# Patient Record
Sex: MALE | Race: WHITE
[De-identification: names, ages, dates, MRNs, and addresses within clinical notes are randomized per-mention and may not be internally consistent; named-entity substitution may affect disease eponyms.]

---

## 2017-03-21 ENCOUNTER — HOSPITAL ENCOUNTER (INPATIENT)
Dept: HOSPITAL 10 - E/R | Age: 67
LOS: 2 days | Discharge: HOME | DRG: 439 | End: 2017-03-23
Attending: INTERNAL MEDICINE | Admitting: INTERNAL MEDICINE
Payer: COMMERCIAL

## 2017-03-21 VITALS
HEIGHT: 64 IN | WEIGHT: 196.21 LBS | BODY MASS INDEX: 33.5 KG/M2 | WEIGHT: 196.21 LBS | BODY MASS INDEX: 33.5 KG/M2 | HEIGHT: 64 IN

## 2017-03-21 VITALS — DIASTOLIC BLOOD PRESSURE: 75 MMHG | RESPIRATION RATE: 20 BRPM | SYSTOLIC BLOOD PRESSURE: 132 MMHG | HEART RATE: 80 BPM

## 2017-03-21 VITALS — TEMPERATURE: 98.6 F

## 2017-03-21 DIAGNOSIS — K70.30: ICD-10-CM

## 2017-03-21 DIAGNOSIS — I10: ICD-10-CM

## 2017-03-21 DIAGNOSIS — E66.01: ICD-10-CM

## 2017-03-21 DIAGNOSIS — F17.210: ICD-10-CM

## 2017-03-21 DIAGNOSIS — F10.20: ICD-10-CM

## 2017-03-21 DIAGNOSIS — K85.90: Primary | ICD-10-CM

## 2017-03-21 DIAGNOSIS — E87.5: ICD-10-CM

## 2017-03-21 DIAGNOSIS — R73.03: ICD-10-CM

## 2017-03-21 DIAGNOSIS — N17.9: ICD-10-CM

## 2017-03-21 LAB
ADD SCAN DIFF: NO
ALBUMIN SERPL-MCNC: 4.2 G/DL (ref 3.3–4.9)
ALBUMIN/GLOB SERPL: 1.23 {RATIO}
ALP SERPL-CCNC: 210 IU/L (ref 42–121)
ALT SERPL-CCNC: 70 IU/L (ref 13–69)
ANION GAP SERPL CALC-SCNC: 17 MMOL/L (ref 8–16)
AST SERPL-CCNC: 103 IU/L (ref 15–46)
BASOPHILS # BLD AUTO: 0.1 10^3/UL (ref 0–0.1)
BASOPHILS NFR BLD: 0.7 % (ref 0–2)
BILIRUB DIRECT SERPL-MCNC: 0 MG/DL (ref 0–0.2)
BILIRUB SERPL-MCNC: 0.2 MG/DL (ref 0.2–1.3)
BUN SERPL-MCNC: 34 MG/DL (ref 7–20)
CALCIUM SERPL-MCNC: 8.8 MG/DL (ref 8.4–10.2)
CHLORIDE SERPL-SCNC: 100 MMOL/L (ref 97–110)
CO2 SERPL-SCNC: 22 MMOL/L (ref 21–31)
CREAT SERPL-MCNC: 1.56 MG/DL (ref 0.61–1.24)
EOSINOPHIL # BLD: 0.1 10^3/UL (ref 0–0.5)
EOSINOPHIL NFR BLD: 0.8 % (ref 0–7)
ERYTHROCYTE [DISTWIDTH] IN BLOOD BY AUTOMATED COUNT: 12.8 % (ref 11.5–14.5)
GLOBULIN SER-MCNC: 3.4 G/DL (ref 1.3–3.2)
GLUCOSE SERPL-MCNC: 146 MG/DL (ref 70–220)
HCT VFR BLD CALC: 41.4 % (ref 42–52)
HGB BLD-MCNC: 14.6 G/DL (ref 14–18)
LYMPHOCYTES # BLD AUTO: 2.5 10^3/UL (ref 0.8–2.9)
LYMPHOCYTES NFR BLD AUTO: 17.5 % (ref 15–51)
MCH RBC QN AUTO: 35.4 PG (ref 29–33)
MCHC RBC AUTO-ENTMCNC: 35.3 G/DL (ref 32–37)
MCV RBC AUTO: 100.2 FL (ref 82–101)
MONOCYTES # BLD: 1.4 10^3/UL (ref 0.3–0.9)
MONOCYTES NFR BLD: 9.5 % (ref 0–11)
NEUTROPHILS # BLD: 10.2 10^3/UL (ref 1.6–7.5)
NEUTROPHILS NFR BLD AUTO: 70.3 % (ref 39–77)
NRBC # BLD MANUAL: 0 10^3/UL (ref 0–0)
NRBC BLD QL: 0 /100WBC (ref 0–0)
PLATELET # BLD: 218 10^3/UL (ref 140–415)
PMV BLD AUTO: 9.2 FL (ref 7.4–10.4)
POTASSIUM SERPL-SCNC: 5.3 MMOL/L (ref 3.5–5.1)
PROT SERPL-MCNC: 7.6 G/DL (ref 6.1–8.1)
RBC # BLD AUTO: 4.13 10^6/UL (ref 4.7–6.1)
SODIUM SERPL-SCNC: 134 MMOL/L (ref 135–144)
WBC # BLD AUTO: 14.5 10^3/UL (ref 4.8–10.8)

## 2017-03-21 PROCEDURE — 96375 TX/PRO/DX INJ NEW DRUG ADDON: CPT

## 2017-03-21 PROCEDURE — C9113 INJ PANTOPRAZOLE SODIUM, VIA: HCPCS

## 2017-03-21 PROCEDURE — 85730 THROMBOPLASTIN TIME PARTIAL: CPT

## 2017-03-21 PROCEDURE — 83690 ASSAY OF LIPASE: CPT

## 2017-03-21 PROCEDURE — 96374 THER/PROPH/DIAG INJ IV PUSH: CPT

## 2017-03-21 PROCEDURE — 36415 COLL VENOUS BLD VENIPUNCTURE: CPT

## 2017-03-21 PROCEDURE — 74176 CT ABD & PELVIS W/O CONTRAST: CPT

## 2017-03-21 PROCEDURE — 90686 IIV4 VACC NO PRSV 0.5 ML IM: CPT

## 2017-03-21 PROCEDURE — 80061 LIPID PANEL: CPT

## 2017-03-21 PROCEDURE — 85610 PROTHROMBIN TIME: CPT

## 2017-03-21 PROCEDURE — 85025 COMPLETE CBC W/AUTO DIFF WBC: CPT

## 2017-03-21 PROCEDURE — 83735 ASSAY OF MAGNESIUM: CPT

## 2017-03-21 PROCEDURE — 82150 ASSAY OF AMYLASE: CPT

## 2017-03-21 PROCEDURE — 84100 ASSAY OF PHOSPHORUS: CPT

## 2017-03-21 PROCEDURE — 80048 BASIC METABOLIC PNL TOTAL CA: CPT

## 2017-03-21 PROCEDURE — 80053 COMPREHEN METABOLIC PANEL: CPT

## 2017-03-21 RX ADMIN — AMLODIPINE BESYLATE SCH MG: 10 TABLET ORAL at 23:00

## 2017-03-21 RX ADMIN — FOLIC ACID SCH MLS/HR: 5 INJECTION, SOLUTION INTRAMUSCULAR; INTRAVENOUS; SUBCUTANEOUS at 21:37

## 2017-03-21 RX ADMIN — FOLIC ACID SCH MLS/HR: 5 INJECTION, SOLUTION INTRAMUSCULAR; INTRAVENOUS; SUBCUTANEOUS at 23:54

## 2017-03-21 NOTE — ERA
ER Documentation


Chief Complaint


Date/Time


DATE: 3/21/17 


TIME: 19:17


Chief Complaint


LUQ PAIN RADIATES TO THE BACK





HPI


The patient is a 76-year-old male, presenting to the ER because of left upper 

quadrant abdominal pain intermittently for the last 3 days, 5/10, worse with 

constipation.  He saw his physician 3 days ago and treated with pain 

medication.  He denies fever, chills, neck pain, chest pain, dyspnea, vomiting, 

dysuria.  He smokes and drinks





Past medical history: Hypertension





ROS


All systems reviewed and are negative except as per history of present illness.





Medications


Home Meds


Reported Medications


Amlodipine Besylate* (Amlodipine Besylate*) 10 Mg Tablet, 10 MG PO DAILY, #30 

TAB


   3/21/17


Atenolol* (Atenolol*) 25 Mg Tablet, 25 MG PO DAILY


   7/15/13


Discontinued Reported Medications


Diclofenac Sodium* (Diclofenac Sodium*) 75 Mg Tablet.dr, 75 MG PO DAILY, TAB


   10/23/15


Aspirin Ec (Aspir 81) 81 Mg Tablet.dr, 81 MG PO DAILY


   7/15/13


Discontinued Scripts


Naproxen* (Naprosyn*) 500 Mg Tablet, 500 MG PO BID, #30 TAB


   Prov:TATIANA ADAMS DO         10/23/15


Diazepam* (Valium*) 5 Mg Tablet, 5 MG PO Q8 for MUSCLE SPASMS, #20 TAB


   Prov:TATIANA ADAMS DO         10/23/15


Hydrocodone Bit-Acetaminophen* (Norco*) 5-325 Mg Tab, 1 TAB PO Q6 Y for PAIN, #

20 TAB


   Prov:TATIANA ADAMS DO         10/23/15





Allergies


Allergies:  


Coded Allergies:  


     No Known Drug Allergy (Verified  Allergy, Mild, 3/21/17)





PMhx/Soc


History of Surgery:  No


Anesthesia Reaction:  No


Hx Neurological Disorder:  No


Hx Respiratory Disorders:  No


Hx Cardiac Disorders:  Yes (htn)


Hx Psychiatric Problems:  No


Hx Miscellaneous Medical Probl:  Yes (ALCOHOL USE, arthritis)


Hx Alcohol Use:  Yes


Hx Substance Use:  No


Hx Tobacco Use:  No





Physical Exam


Vitals





Vital Signs








  Date Time  Temp Pulse Resp B/P Pulse Ox O2 Delivery O2 Flow Rate FiO2


 


3/21/17 19:30 98.0 72 18 151/71 96 Room Air  


 


3/21/17 15:09 98.0 80 19 137/69 98   








Physical Exam


 Const:      No acute distress.


 Head:        Atraumatic.


 Eyes:       Normal Conjunctiva.


 ENT:         Normal External Ears, Nose and Mouth.


 Neck:        Full range of motion.  No meningismus.


 Resp:         Clear to auscultation bilaterally.


 Cardio:       Regular rate and rhythm, no murmurs.


 Abd:         Soft,  non distended, normal bowel sounds, moderate left upper 

quadrant tenderness, no right lower quadrant, right upper quadrant, rigidity, 

rebound, CVA tenderness


 Skin:         No petechiae or rashes.


 Back:        No midline or flank tenderness.


 Ext:          No cyanosis, or edema.


 Neur:        Awake and alert. No focal deficit


 Psych:        Normal Mood and Affect.


Result Diagram:  


3/21/17 1945                                                                   

             3/21/17 1945





Results 24 hrs





 Laboratory Tests








Test


  3/21/17


19:45


 


Alanine Aminotransferase


(ALT/SGPT) 70IU/L 


 


 


Albumin 4.2g/dl 


 


Albumin/Globulin Ratio 1.23 


 


Alkaline Phosphatase 210IU/L 


 


Anion Gap 17 


 


Aspartate Amino Transf


(AST/SGOT) 103IU/L 


 


 


Basophils # 0.110^3/ul 


 


Basophils % 0.7% 


 


Blood Urea Nitrogen 34mg/dl 


 


Calcium Level 8.8mg/dl 


 


Carbon Dioxide Level 22mmol/L 


 


Chloride Level 100mmol/L 


 


Creatinine 1.56mg/dl 


 


Direct Bilirubin 0.00mg/dl 


 


Eosinophils # 0.110^3/ul 


 


Eosinophils % 0.8% 


 


Globulin 3.40g/dl 


 


Glucose Level 146mg/dl 


 


Hematocrit 41.4% 


 


Hemoglobin 14.6g/dl 


 


Indirect Bilirubin 0.2mg/dl 


 


Lipase 366U/L 


 


Lymphocytes # 2.510^3/ul 


 


Lymphocytes % 17.5% 


 


Mean Corpuscular Hemoglobin 35.4pg 


 


Mean Corpuscular Hemoglobin


Concent 35.3g/dl 


 


 


Mean Corpuscular Volume 100.2fl 


 


Mean Platelet Volume 9.2fl 


 


Monocytes # 1.410^3/ul 


 


Monocytes % 9.5% 


 


Neutrophils # 10.210^3/ul 


 


Neutrophils % 70.3% 


 


Nucleated Red Blood Cells # 0.010^3/ul 


 


Nucleated Red Blood Cells % 0.0/100WBC 


 


Platelet Count 67291^3/UL 


 


Potassium Level 5.3mmol/L 


 


Red Blood Count 4.1310^6/ul 


 


Red Cell Distribution Width 12.8% 


 


Sodium Level 134mmol/L 


 


Total Bilirubin 0.2mg/dl 


 


Total Protein 7.6g/dl 


 


White Blood Count 14.510^3/ul 








 Current Medications








 Medications


  (Trade)  Dose


 Ordered  Sig/Chelly


 Route


 PRN Reason  Start Time


 Stop Time Status Last Admin


Dose Admin


 


 Morphine Sulfate


  (morphine)  4 mg  ONCE  STAT


 IV


   3/21/17 19:22


 3/21/17 19:24 DC 3/21/17 19:55


 


 


 Ondansetron HCl 4


 mg  4 mg  ONCE  STAT


 IV


   3/21/17 19:22


 3/21/17 19:24 DC 3/21/17 19:56


 


 


 Sodium Chloride


  (NS)  500 ml @ 


 500 mls/hr  Q1H ONCE


 IV


   3/21/17 21:00


 3/21/17 21:59 UNV  


 











Procedures/[place] [address]


 Radiology Main Line: [phone]





 DIAGNOSTIC IMAGING REPORT





 Patient: ANA NIÑO   : 1950   Age: 66  Sex: M                  

      


 MR #:    [id number]   Acct #:   [id number]    DOS: 17


 Ordering MD: BRENTON QUINN MD   Location:  E/R   Room/Bed:                  

                          


 








PROCEDURE:   CT abdomen and pelvis without contrast. 


 


CLINICAL INDICATION:   Abdominal Pain   


 


TECHNIQUE:   CT scan of the abdomen and pelvis without contrast was performed 

and is reconstructed at 2.5 mm contiguous axial intervals from the dome of the 

diaphragm to the inferior pubic rami..  The patient was scanned without 

intravenous contrast.  Sagittal and coronal reformatted images were obtained 

from the axial source images. The calculated radiation dose measures 1191 mGy 

centimeters. The CTDI measures 21 mGy.


 


 


COMPARISON:   None. 


 


FINDINGS:


 


 


The lung bases are clear of any infiltrate or nodule.  No effusion is seen.


The liver has a nodular contour compatible with cirrhosis.  There is diminished 

attenuation.  No mass or ductal dilatation is seen.  Patency of the portal vein 

is indeterminate on this noncontrast study.    No gallstones are visualized.  

No splenic, adrenal or pancreatic abnormalities present. 


 Kidneys are of normal size and contour.  No hydronephrosis, calculus or masses 

seen.  Ureters are of normal course and caliber with no stone.  No bladder mass 

or stone is present. Prostate and seminal vesicles appear normal.  


  There is no aneurysm.  No adenopathy is present. There are vascular 

calcifications.


  No bowel mass or obstruction is present.  The appendix is normal.  No phlegmon

, ascites or pneumoperitoneum is visualized.


The osseous structures are intact.


 


IMPRESSION:


No evidence of urolithiasis, obstructive uropathy, diverticulitis or 

appendicitis.


 


Cirrhosis.


 


Vascular calcifications.


_____________________________________________ 


.Ned Roman MD, MD           Date    Time 


Electronically viewed and signed by .Ned Roman MD, MD on 2017 20:

27 


 


D:  2017 20:27  T:  2017 20:27


.A/





CC: BRENTON QUINN MD





MEDICAL MAKING DECISION: The patient is a 66-year-old male, presenting with 

acute pancreatitis, acute hyperkalemia, acute kidney injury.  He was treated 

with normal saline 500 mL normal saline IV morphine 4 mg IV for pain, Zofran 4 

mg IV for nausea with good response.  The differential diagnoses considered 

include but are not limited to cholelithiasis, cholecystitis, cystitis, 

pancreatitis, hepatitis, gastritis, peptic ulcer disease, gastric ulcer, 

appendicitis, diverticulitis, cholangitis, choledocholithiasis, partial small 

bowel obstruction.





Departure


Diagnosis:  


 Primary Impression:  


 Pancreatitis


 Additional Impressions:  


 Hyperkalemia


 Acute kidney injury


Condition:  Stable


Comments


I discussed the findings with the patient. I discussed the patient with his 

physician Dr. Parker who was made aware of the lab, the treatment, the patient 

condition. The patient is admitted to medical surgery bed at 8:50 pm











BRENTON QUINN MD Mar 21, 2017 19:18

## 2017-03-22 VITALS — SYSTOLIC BLOOD PRESSURE: 143 MMHG | DIASTOLIC BLOOD PRESSURE: 72 MMHG | RESPIRATION RATE: 18 BRPM

## 2017-03-22 VITALS — DIASTOLIC BLOOD PRESSURE: 64 MMHG | RESPIRATION RATE: 20 BRPM | SYSTOLIC BLOOD PRESSURE: 135 MMHG

## 2017-03-22 LAB
ADD SCAN DIFF: NO
ALBUMIN SERPL-MCNC: 3.5 G/DL (ref 3.3–4.9)
ALBUMIN/GLOB SERPL: 1.2 {RATIO}
ALP SERPL-CCNC: 166 IU/L (ref 42–121)
ALT SERPL-CCNC: 61 IU/L (ref 13–69)
AMYLASE SERPL-CCNC: 58 U/L (ref 11–123)
ANION GAP SERPL CALC-SCNC: 15 MMOL/L (ref 8–16)
APTT BLD: 30.1 SEC (ref 25–35)
AST SERPL-CCNC: 66 IU/L (ref 15–46)
BASOPHILS # BLD AUTO: 0.1 10^3/UL (ref 0–0.1)
BASOPHILS NFR BLD: 1.1 % (ref 0–2)
BILIRUB DIRECT SERPL-MCNC: 0 MG/DL (ref 0–0.2)
BILIRUB SERPL-MCNC: 0.4 MG/DL (ref 0.2–1.3)
BUN SERPL-MCNC: 29 MG/DL (ref 7–20)
CALCIUM SERPL-MCNC: 8.3 MG/DL (ref 8.4–10.2)
CHLORIDE SERPL-SCNC: 107 MMOL/L (ref 97–110)
CHOLEST SERPL-MCNC: 168 MG/DL (ref 100–200)
CHOLEST/HDLC SERPL: 2.8 RATIO
CO2 SERPL-SCNC: 19 MMOL/L (ref 21–31)
CREAT SERPL-MCNC: 1.16 MG/DL (ref 0.61–1.24)
EOSINOPHIL # BLD: 0.2 10^3/UL (ref 0–0.5)
EOSINOPHIL NFR BLD: 1.7 % (ref 0–7)
ERYTHROCYTE [DISTWIDTH] IN BLOOD BY AUTOMATED COUNT: 13.1 % (ref 11.5–14.5)
GLOBULIN SER-MCNC: 2.9 G/DL (ref 1.3–3.2)
GLUCOSE SERPL-MCNC: 113 MG/DL (ref 70–220)
HCT VFR BLD CALC: 41.8 % (ref 42–52)
HDLC SERPL-MCNC: 58 MG/DL (ref 30–78)
HGB BLD-MCNC: 14 G/DL (ref 14–18)
INR PPP: 1.09
LYMPHOCYTES # BLD AUTO: 2.7 10^3/UL (ref 0.8–2.9)
LYMPHOCYTES NFR BLD AUTO: 23.4 % (ref 15–51)
MAGNESIUM SERPL-MCNC: 2.3 MG/DL (ref 1.7–2.5)
MCH RBC QN AUTO: 34.8 PG (ref 29–33)
MCHC RBC AUTO-ENTMCNC: 33.5 G/DL (ref 32–37)
MCV RBC AUTO: 104 FL (ref 82–101)
MONOCYTES # BLD: 1.2 10^3/UL (ref 0.3–0.9)
MONOCYTES NFR BLD: 10.7 % (ref 0–11)
NEUTROPHILS # BLD: 7.1 10^3/UL (ref 1.6–7.5)
NEUTROPHILS NFR BLD AUTO: 62.1 % (ref 39–77)
NRBC # BLD MANUAL: 0 10^3/UL (ref 0–0)
NRBC BLD QL: 0 /100WBC (ref 0–0)
PHOSPHATE SERPL-MCNC: 3.2 MG/DL (ref 2.5–4.9)
PLATELET # BLD: 204 10^3/UL (ref 140–415)
PMV BLD AUTO: 10.5 FL (ref 7.4–10.4)
POTASSIUM SERPL-SCNC: 5.2 MMOL/L (ref 3.5–5.1)
PROT SERPL-MCNC: 6.4 G/DL (ref 6.1–8.1)
PROTHROMBIN TIME: 14.1 SEC (ref 12.2–14.2)
PT RATIO: 1.1
RBC # BLD AUTO: 4.02 10^6/UL (ref 4.7–6.1)
SODIUM SERPL-SCNC: 136 MMOL/L (ref 135–144)
TRIGL SERPL-MCNC: 99 MG/DL (ref 0–149)
WBC # BLD AUTO: 11.5 10^3/UL (ref 4.8–10.8)

## 2017-03-22 RX ADMIN — FOLIC ACID SCH MLS/HR: 5 INJECTION, SOLUTION INTRAMUSCULAR; INTRAVENOUS; SUBCUTANEOUS at 07:05

## 2017-03-22 RX ADMIN — FOLIC ACID SCH MLS/HR: 5 INJECTION, SOLUTION INTRAMUSCULAR; INTRAVENOUS; SUBCUTANEOUS at 16:24

## 2017-03-22 RX ADMIN — PANTOPRAZOLE SODIUM SCH MG: 40 TABLET, DELAYED RELEASE ORAL at 17:09

## 2017-03-22 RX ADMIN — FOLIC ACID SCH MLS/HR: 5 INJECTION, SOLUTION INTRAMUSCULAR; INTRAVENOUS; SUBCUTANEOUS at 09:00

## 2017-03-22 RX ADMIN — AMLODIPINE BESYLATE SCH MG: 10 TABLET ORAL at 20:59

## 2017-03-22 NOTE — HP
DATE OF ADMISSION: 03/21/2017

 

ADMITTING PHYSICIAN:  Dr. Parker 

 

PRIMARY CARE PHYSICIAN:  Dr. Shaan Meraz 

 

CHIEF COMPLAINT ON ADMISSION:  Abdominal pain.

 

HISTORY OF PRESENT ILLNESS:  This is a 66-year-old male with a history of cirrhosis, alcohol abuse, 
hypertension who presented to the emergency department with a complaint of periumbilical epigastric 
pain also going to the lower quadrant of his abdomen.  This has been going on for the past week, and
 he reports that it has been intermittently worsening, up to 5/10.  The patient did have episodes of
 constipation, but did have a bowel movement yesterday.  Apparently he went to his PCP, was given pa
in medication at that time.  He denies any fevers, chills, nausea, vomiting, diarrhea, melena, hemat
emesis, or hematochezia.  According to the wife who was at bedside, the patient has been having a co
ugh for the past week.  It looks like they both had upper respiratory infection, and last week he di
d have an episode of nosebleed.  Yesterday he coughed up a little bit of blood, but has resolved sin
ce then.  The patient is also a tobacco user.  He smokes 1 pack a day.  In the emergency department,
 he was found to have elevated lipase diagnosed with mild pancreatitis.  CAT scan of the abdomen and
 pelvis did not show any inflammation, however.  He has been n.p.o. overnight.  His lipase is coming
 down.  He is hungry this morning.  He denies any nausea or vomiting and he reports that his pain is
 better.

 

ALLERGIES:  NO KNOWN ALLERGIES.

 

PAST MEDICAL HISTORY:

1.  Hypertension.

2.  Cirrhosis.

3.  Alcohol use.

4.  Tobacco use.

5.  Prediabetes according to the family.

 

PAST SURGICAL HISTORY:  None.

 

SOCIAL HISTORY:  The patient drinks up to 6 beers a day for the past 20 years.  This has not stopped
.  His last beer was yesterday, he smokes 1 pack a day for the past 7 years.  No other history of dr
ug use.  He lives with his wife.

 

OUTPATIENT MEDICATIONS:

1.  Norvasc 10 mg p.o. daily.

2.  Atenolol 25 mg p.o. daily.

 

REVIEW OF SYSTEMS:  As per HPI.  The patient denies any previous history of coronary artery disease 
or cerebrovascular accident.  He is ambulating without any DME, very stable at baseline.

 

PHYSICAL EXAMINATION:

VITAL SIGNS:  Temperature 97.9, heart rate of 73, sinus rhythm, respiratory rate of 18, blood pressu
re 143/72.  Patient is satting 96% on room air.

GENERAL:  He is alert and oriented x4.  He is in no acute distress currently.  He is obese with an o
bese abdomen.

HEENT:  Pupils are equally round and reactive to light.  Extraocular muscles are intact.  Anicteric 
sclerae.

NECK:  No JVD, no thyromegaly.

LUNGS:  Clear to auscultation bilaterally.  No wheezes or crackles heard.

ABDOMEN:  Obese.  He reports currently that most of his discomfort is on the lower abdomen more so a
nd a little bit in the epigastric area.

EXTREMITIES:  No edema, clubbing, or cyanosis.

HEART:  Regular rate and rhythm.  No murmur, rubs, or gallops.

NEUROLOGIC:  Grossly intact.

 

LABORATORY DATA:  White blood cell count is 11.5 this morning, hemoglobin of 14.0, MCV is 104, plate
let count of 204, hematocrit 41.8 Chemistry with a sodium of 136, potassium 5.2, chloride 107, bicar
bonate 19, BUN 29, creatinine 1.16, glucose of 113, phosphorus 3.2, magnesium 2.3, total bilirubin 0
.4, AST 66, ALT 61, alkaline phosphatase of 166.  Total protein 6.4, albumin 3.5.  A fasting lipid p
katie is fairly stable.  Amylase is 58, lipase is down to 238.  INR 1.09, PT 14.1, PTT 30.1.

 

RADIOLOGICAL DATA:  CAT scan of the abdomen and pelvis shows no evidence of urolithiasis, obstructiv
e uropathy, diverticulitis, or appendicitis.  Cirrhosis is noted with liver with nodular contour, di
minished attenuation, no mass or ductal dilatation.  Patency of the portal vein is indeterminate.  H
e no gallstones.  No splenic, adrenal, or pancreatic abnormalities, and there is vascular calcificat
ion noted.

 

ASSESSMENT AND PLAN:  This is a 66-year-old male with:

1.  Abdominal pain, looks like more epigastric and it extends to the lower quadrants for what the pia weston is reporting.  CAT scan is fairly unremarkable besides cirrhosis.  He is an active alcohol dri
nker at this time; therefore, he is pancreatitis, is likely related to his alcohol consumption.  I h
ave discussed this with him.  He has a banana bag on board.  Will start him on clear liquids since h
is pancreatic enzymes are improved at this point and monitor for another 24 hours.

2.  Cirrhosis.  Again I have advised for the patient to quit drinking at this point, he is agreeable
 to do.

3.  Tobacco use.  Will provide with a nicotine patch and again he is advised to quit drinking.

4.  Morbid obesity.  Weight loss is encouraged.

5.  Hypertension.  Continue home medication.

6.  Prediabetes.  Will check a hemoglobin A1c.  For now, his blood sugars are fairly stable.

7.  Prophylaxis:  Sequential compression devices to lower extremity for deep venous thrombosis proph
ylaxis and patient on Protonix, will increase to b.i.d. for possible ulcer, gastric.

 

DISPOSITION:  The patient may be discharged in the next 24 hours if remains stable and tolerates p.o
.

 

 

Dictated By: ASHLI CEDILLO/CORRINA

DD:    03/22/2017 11:01:15

DT:    03/22/2017 12:14:13

Conf#: [id number]

DID#:  [id number]

## 2017-03-23 VITALS — SYSTOLIC BLOOD PRESSURE: 155 MMHG | DIASTOLIC BLOOD PRESSURE: 70 MMHG | RESPIRATION RATE: 17 BRPM

## 2017-03-23 LAB
ADD SCAN DIFF: NO
AMYLASE SERPL-CCNC: 81 U/L (ref 11–123)
ANION GAP SERPL CALC-SCNC: 16 MMOL/L (ref 8–16)
BASOPHILS # BLD AUTO: 0.1 10^3/UL (ref 0–0.1)
BASOPHILS NFR BLD: 1.7 % (ref 0–2)
BUN SERPL-MCNC: 18 MG/DL (ref 7–20)
CALCIUM SERPL-MCNC: 8.3 MG/DL (ref 8.4–10.2)
CHLORIDE SERPL-SCNC: 108 MMOL/L (ref 97–110)
CO2 SERPL-SCNC: 18 MMOL/L (ref 21–31)
CREAT SERPL-MCNC: 0.95 MG/DL (ref 0.61–1.24)
EOSINOPHIL # BLD: 0.4 10^3/UL (ref 0–0.5)
EOSINOPHIL NFR BLD: 4.8 % (ref 0–7)
ERYTHROCYTE [DISTWIDTH] IN BLOOD BY AUTOMATED COUNT: 12.8 % (ref 11.5–14.5)
GLUCOSE SERPL-MCNC: 93 MG/DL (ref 70–220)
HCT VFR BLD CALC: 41.7 % (ref 42–52)
HGB BLD-MCNC: 14.1 G/DL (ref 14–18)
LYMPHOCYTES # BLD AUTO: 2.6 10^3/UL (ref 0.8–2.9)
LYMPHOCYTES NFR BLD AUTO: 32.4 % (ref 15–51)
MAGNESIUM SERPL-MCNC: 1.8 MG/DL (ref 1.7–2.5)
MCH RBC QN AUTO: 33.6 PG (ref 29–33)
MCHC RBC AUTO-ENTMCNC: 33.8 G/DL (ref 32–37)
MCV RBC AUTO: 99.3 FL (ref 82–101)
MONOCYTES # BLD: 0.9 10^3/UL (ref 0.3–0.9)
MONOCYTES NFR BLD: 11.2 % (ref 0–11)
NEUTROPHILS # BLD: 3.9 10^3/UL (ref 1.6–7.5)
NEUTROPHILS NFR BLD AUTO: 48.5 % (ref 39–77)
NRBC # BLD MANUAL: 0 10^3/UL (ref 0–0)
NRBC BLD QL: 0 /100WBC (ref 0–0)
PHOSPHATE SERPL-MCNC: 3.7 MG/DL (ref 2.5–4.9)
PLATELET # BLD: 200 10^3/UL (ref 140–415)
PMV BLD AUTO: 9.3 FL (ref 7.4–10.4)
POTASSIUM SERPL-SCNC: 4.5 MMOL/L (ref 3.5–5.1)
RBC # BLD AUTO: 4.2 10^6/UL (ref 4.7–6.1)
SODIUM SERPL-SCNC: 137 MMOL/L (ref 135–144)
WBC # BLD AUTO: 8.1 10^3/UL (ref 4.8–10.8)

## 2017-03-23 RX ADMIN — FOLIC ACID SCH MLS/HR: 5 INJECTION, SOLUTION INTRAMUSCULAR; INTRAVENOUS; SUBCUTANEOUS at 13:05

## 2017-03-23 RX ADMIN — PANTOPRAZOLE SODIUM SCH MG: 40 TABLET, DELAYED RELEASE ORAL at 05:35

## 2017-03-23 RX ADMIN — FOLIC ACID SCH MLS/HR: 5 INJECTION, SOLUTION INTRAMUSCULAR; INTRAVENOUS; SUBCUTANEOUS at 10:18

## 2017-03-23 RX ADMIN — FOLIC ACID SCH MLS/HR: 5 INJECTION, SOLUTION INTRAMUSCULAR; INTRAVENOUS; SUBCUTANEOUS at 04:32

## 2017-03-23 NOTE — PDOCDIS
Discharge Instructions


CONDITION


Patient Condition:  Good





HOME CARE INSTRUCTIONS:


Special Diet:  Soft diet





ACTIVITY:








Activity Restrictions:   No Restrictions











FOLLOW UP/APPOINTMENTS


Appointments


Follow up with PCP within 1 weeks 


Follow up with GI outpatient re Cirrhosis within 1 to 2 weeks











ASHLI BARDALES Mar 23, 2017 14:20

## 2017-03-23 NOTE — PN
Date/Time of Note


Date/Time of Note


DATE: 3/23/17 


TIME: 13:44





Assessment/Plan


VTE Prophylaxis


VTE Prophylaxis Intervention:  SCD's





Lines/Catheters


IV Catheter Type (from Nrs):  Peripheral IV


Urinary Cath still in place:  No





Assessment/Plan


Assessment/Plan


66-year-old male with:





1.  Abdominal pain, looks like more epigastric and it extends to the lower 

quadrants for what the patient is reporting.


CAT scan is fairly unremarkable besides cirrhosis.  


ETOH use, but patient agrees to quit at this time 


Continue Thiamine, Folate, MVI


Tolerating soft diet  


D/c Home 





2.  Cirrhosis. quit ETOH today.


 Follow up with GI outpatient





3.  Tobacco use. Patient agrees to quit drinking.





4.  Morbid obesity.  Weight loss is encouraged.





5.  Hypertension.  Continue home medication.





6.  Prediabetes.  Diabetic diet. weight loss .





Prophylaxis:  Sequential compression devices to lower extremity for deep venous 

thrombosis prophylaxis and patient on Protonix, will increase to b.i.d. for 

possible ulcer, gastric.


DISPOSITION:  D/c home today and follow up with GI as outpatient





Subjective


24 Hr Interval Summary


Free Text/Dictation


Patient doing well today, tolerating po and no complaints, wants to go home 


WBC wnl 


D/c home





Exam/Review of Systems


Vital Signs


Vitals





 Vital Signs








  Date Time  Temp Pulse Resp B/P Pulse Ox O2 Delivery O2 Flow Rate FiO2


 


3/23/17 08:44 98.4 62 17 155/70 97   


 


3/21/17 22:50      Room Air  














 Intake and Output   


 


 3/22/17 3/22/17 3/23/17





 14:59 22:59 06:59


 


Intake Total  1680 ml 1825 ml


 


Output Total  1650 ml 1720 ml


 


Balance  30 ml 105 ml











Exam


Constitutional:  alert, obese, oriented, well developed


Respiratory:  clear to auscultation, normal air movement


Cardiovascular:  nl pulses, regular rate and rhythm


Gastrointestinal:  non-tender, soft


Musculoskeletal:  nl extremities to inspection


Extremities:  normal pulses, other (no edema, clubbing or cyanosis )


Neurological:  CNS II-XII intact, nl mental status, nl speech, nl strength





Results


Result Diagram:  


3/23/17 0451                                                                   

             3/23/17 0451





Results 24 hrs





Laboratory Tests








Test


  3/23/17


04:51


 


White Blood Count 8.1  #


 


Red Blood Count 4.20  L


 


Hemoglobin 14.1  


 


Hematocrit 41.7  L


 


Mean Corpuscular Volume 99.3  


 


Mean Corpuscular Hemoglobin 33.6  H


 


Mean Corpuscular Hemoglobin


Concent 33.8  


 


 


Red Cell Distribution Width 12.8  


 


Platelet Count 200  


 


Mean Platelet Volume 9.3  


 


Neutrophils % 48.5  


 


Lymphocytes % 32.4  


 


Monocytes % 11.2  H


 


Eosinophils % 4.8  


 


Basophils % 1.7  


 


Nucleated Red Blood Cells % 0.0  


 


Neutrophils # 3.9  


 


Lymphocytes # 2.6  


 


Monocytes # 0.9  


 


Eosinophils # 0.4  


 


Basophils # 0.1  


 


Nucleated Red Blood Cells # 0.0  


 


Sodium Level 137  


 


Potassium Level 4.5  


 


Chloride Level 108  


 


Carbon Dioxide Level 18  L


 


Anion Gap 16  


 


Blood Urea Nitrogen 18  #


 


Creatinine 0.95  


 


Glucose Level 93  


 


Calcium Level 8.3  L


 


Phosphorus Level 3.7  


 


Magnesium Level 1.8  


 


Amylase Level 81  


 


Lipase 248  











Medications


Medications





 Current Medications


Sodium Chloride (NS) 1,000 ml @  100 mls/hr Q10H IV  Last administered on 3/23/

17at 04:32; Admin Dose 100 MLS/HR;  Start 3/21/17 at 21:05


Ondansetron HCl (Zofran Inj) 4 mg Q6H  PRN IV NAUSEA AND/OR VOMITING;  Start 3/

21/17 at 21:30


Acetaminophen (Tylenol Tab) 650 mg Q6H  PRN PO PAIN LEVEL 1-3 OR FEVER;  Start 3

/21/17 at 21:30


Acetaminophen/ Hydrocodone Bitart (Norco (5/325)) 1 tab Q6H  PRN PO MODERATE 

PAIN LEVEL 4-6 Last administered on 3/23/17at 04:40; Admin Dose 1 TAB;  Start 3/

21/17 at 21:30


Acetaminophen/ Hydrocodone Bitart (Norco (5/325)) 2 tab Q6H  PRN PO SEVERE PAIN 

LEVEL 7-10 Last administered on 3/22/17at 20:03; Admin Dose 2 TAB;  Start 3/21/

17 at 21:30


Morphine Sulfate (morphine) 2 mg Q4H  PRN IV SEVERE PAIN LEVEL 7-10 Last 

administered on 3/22/17at 07:54; Admin Dose 2 MG;  Start 3/21/17 at 21:30


Docusate Sodium (Colace) 100 mg Q12H  PRN PO CONSTIPATION;  Start 3/21/17 at 21:

30


Magnesium Hydroxide (Milk Of Mag) 30 ml DAILY  PRN PO CONSTIPATION;  Start 3/21/

17 at 21:30


Bisacodyl (Dulcolax Supp) 10 mg DAILY  PRN [place] CONSTIPATION;  Start 3/21/17 at 21

:30


Atenolol 25 mg 25 mg DAILY PO  Last administered on 3/23/17at 10:18; Admin Dose 

25 MG;  Start 3/22/17 at 09:00


Multivitamins/ Thiamine HCl/ Folic Acid/Sodium Chloride (Mvi-12 Adult/ Vitamin 

B1/Folic Acid/NS) 1,011.2 ml  @ 100 mls/ hr DAILY IVPB  Last administered on 3/

23/17at 10:18; Admin Dose 100 MLS/HR;  Start 3/21/17 at 21:30


Amlodipine Besylate (Norvasc) 10 mg QHS PO  Last administered on 3/22/17at 20:59

; Admin Dose 10 MG;  Start 3/21/17 at 21:30


Pantoprazole (Protonix Tab) 40 mg BID@06,18 PO  Last administered on 3/23/17at 

05:35; Admin Dose 40 MG;  Start 3/22/17 at 18:00











ASHLI BARDALES Mar 23, 2017 13:53

## 2018-03-12 ENCOUNTER — HOSPITAL ENCOUNTER (EMERGENCY)
Dept: HOSPITAL 91 - FTE | Age: 68
Discharge: LEFT BEFORE DISCHARGE | End: 2018-03-12
Payer: SELF-PAY

## 2018-03-12 ENCOUNTER — HOSPITAL ENCOUNTER (EMERGENCY)
Age: 68
Discharge: LEFT BEFORE DISCHARGE | End: 2018-03-12

## 2018-03-12 DIAGNOSIS — Z53.21: Primary | ICD-10-CM

## 2021-05-15 ENCOUNTER — HOSPITAL ENCOUNTER (INPATIENT)
Dept: HOSPITAL 54 - TELE | Age: 71
LOS: 3 days | Discharge: HOME | DRG: 377 | End: 2021-05-18
Attending: NURSE PRACTITIONER | Admitting: NURSE PRACTITIONER
Payer: MEDICARE

## 2021-05-15 VITALS — SYSTOLIC BLOOD PRESSURE: 143 MMHG | DIASTOLIC BLOOD PRESSURE: 81 MMHG

## 2021-05-15 VITALS — BODY MASS INDEX: 35.68 KG/M2 | WEIGHT: 209 LBS | HEIGHT: 64 IN

## 2021-05-15 VITALS — DIASTOLIC BLOOD PRESSURE: 81 MMHG | SYSTOLIC BLOOD PRESSURE: 143 MMHG

## 2021-05-15 DIAGNOSIS — N17.0: ICD-10-CM

## 2021-05-15 DIAGNOSIS — N40.1: ICD-10-CM

## 2021-05-15 DIAGNOSIS — E11.22: ICD-10-CM

## 2021-05-15 DIAGNOSIS — Z79.1: ICD-10-CM

## 2021-05-15 DIAGNOSIS — Z79.84: ICD-10-CM

## 2021-05-15 DIAGNOSIS — E66.01: ICD-10-CM

## 2021-05-15 DIAGNOSIS — K74.60: ICD-10-CM

## 2021-05-15 DIAGNOSIS — K26.4: ICD-10-CM

## 2021-05-15 DIAGNOSIS — N18.9: ICD-10-CM

## 2021-05-15 DIAGNOSIS — D62: ICD-10-CM

## 2021-05-15 DIAGNOSIS — K29.80: ICD-10-CM

## 2021-05-15 DIAGNOSIS — I12.9: ICD-10-CM

## 2021-05-15 DIAGNOSIS — N40.0: ICD-10-CM

## 2021-05-15 DIAGNOSIS — K29.70: ICD-10-CM

## 2021-05-15 DIAGNOSIS — F17.210: ICD-10-CM

## 2021-05-15 DIAGNOSIS — K25.4: Primary | ICD-10-CM

## 2021-05-15 PROCEDURE — G0378 HOSPITAL OBSERVATION PER HR: HCPCS

## 2021-05-15 PROCEDURE — C9113 INJ PANTOPRAZOLE SODIUM, VIA: HCPCS

## 2021-05-15 NOTE — NUR
TELE RN ADMISSION NOTES 

RECEIVED DIRECT ADMIT FROM [facility] THIS 69 YO MALE,PER CAMI,ALERT,ORIENTED X4, WITH CHIEF 
COMPLAINTS OF ABDOMINAL PAIN AND BLACK STOOL PRIOR TO ADMISSION. ABLE TO AMBULATE WITH 
STEADY GAIT,ABLE TO PROVIDE HISTORY,PER FAMILY,HE GOT ALLERGY TO MORPHINE.NO SKIN 
ISSUES,SALINE LOCK LEFT AC INTACT AND PATENT.PROTONIX IV GIVEN IN [place],ABDOMINAL PAIN 
TOLERABLE UPON ARRIVAL IN THE UNIT.ORIENTED TO SET UP.BED ON LOWEST POSITION AND 
LOCKED.HOSPITALIST MADE AWARE OF THE ADMISSION,AWAITING TO PUT ORDERS.CALL LIGHT IN 
REACH,NEEDS ANTICIPATED.

## 2021-05-16 VITALS — DIASTOLIC BLOOD PRESSURE: 52 MMHG | SYSTOLIC BLOOD PRESSURE: 127 MMHG

## 2021-05-16 VITALS — DIASTOLIC BLOOD PRESSURE: 65 MMHG | SYSTOLIC BLOOD PRESSURE: 144 MMHG

## 2021-05-16 VITALS — SYSTOLIC BLOOD PRESSURE: 120 MMHG | DIASTOLIC BLOOD PRESSURE: 62 MMHG

## 2021-05-16 VITALS — SYSTOLIC BLOOD PRESSURE: 128 MMHG | DIASTOLIC BLOOD PRESSURE: 61 MMHG

## 2021-05-16 LAB
BASOPHILS # BLD AUTO: 0.1 /CMM (ref 0–0.2)
BASOPHILS NFR BLD AUTO: 1.1 % (ref 0–2)
BUN SERPL-MCNC: 16 MG/DL (ref 7–18)
CALCIUM SERPL-MCNC: 7.7 MG/DL (ref 8.5–10.1)
CHLORIDE SERPL-SCNC: 108 MMOL/L (ref 98–107)
CHOLEST SERPL-MCNC: 123 MG/DL (ref ?–200)
CO2 SERPL-SCNC: 16 MMOL/L (ref 21–32)
CREAT SERPL-MCNC: 1.5 MG/DL (ref 0.6–1.3)
EOSINOPHIL NFR BLD AUTO: 3 % (ref 0–6)
GLUCOSE SERPL-MCNC: 193 MG/DL (ref 74–106)
HCT VFR BLD AUTO: 27 % (ref 39–51)
HDLC SERPL-MCNC: 39 MG/DL (ref 40–60)
HEMOCCULT STL QL: POSITIVE
HGB BLD-MCNC: 9.2 G/DL (ref 13.5–17.5)
LDLC SERPL DIRECT ASSAY-MCNC: 66 MG/DL (ref 0–99)
LYMPHOCYTES NFR BLD AUTO: 1.6 /CMM (ref 0.8–4.8)
LYMPHOCYTES NFR BLD AUTO: 19.8 % (ref 20–44)
MAGNESIUM SERPL-MCNC: 1.9 MG/DL (ref 1.8–2.4)
MCHC RBC AUTO-ENTMCNC: 34 G/DL (ref 31–36)
MCV RBC AUTO: 95 FL (ref 80–96)
MONOCYTES NFR BLD AUTO: 0.6 /CMM (ref 0.1–1.3)
MONOCYTES NFR BLD AUTO: 7.8 % (ref 2–12)
NEUTROPHILS # BLD AUTO: 5.5 /CMM (ref 1.8–8.9)
NEUTROPHILS NFR BLD AUTO: 68.3 % (ref 43–81)
PHOSPHATE SERPL-MCNC: 2.6 MG/DL (ref 2.5–4.9)
PLATELET # BLD AUTO: 270 /CMM (ref 150–450)
POTASSIUM SERPL-SCNC: 4.4 MMOL/L (ref 3.5–5.1)
RBC # BLD AUTO: 2.88 MIL/UL (ref 4.5–6)
SODIUM SERPL-SCNC: 137 MMOL/L (ref 136–145)
TRIGL SERPL-MCNC: 120 MG/DL (ref 30–150)
WBC NRBC COR # BLD AUTO: 8 K/UL (ref 4.3–11)

## 2021-05-16 RX ADMIN — Medication SCH MG: at 08:20

## 2021-05-16 RX ADMIN — PREDNISOLONE ACETATE SCH ML: 10 SUSPENSION/ DROPS OPHTHALMIC at 09:11

## 2021-05-16 RX ADMIN — PREDNISOLONE ACETATE SCH ML: 10 SUSPENSION/ DROPS OPHTHALMIC at 16:00

## 2021-05-16 RX ADMIN — VITAMIN D, TAB 1000IU (100/BT) SCH UNIT: 25 TAB at 08:20

## 2021-05-16 RX ADMIN — SODIUM CHLORIDE PRN MLS/HR: 9 INJECTION, SOLUTION INTRAVENOUS at 22:30

## 2021-05-16 RX ADMIN — PREDNISOLONE ACETATE SCH ML: 10 SUSPENSION/ DROPS OPHTHALMIC at 12:00

## 2021-05-16 RX ADMIN — INSULIN HUMAN PRN UNITS: 100 INJECTION, SOLUTION PARENTERAL at 11:17

## 2021-05-16 RX ADMIN — BACLOFEN SCH MG: 10 TABLET ORAL at 08:20

## 2021-05-16 RX ADMIN — Medication SCH EACH: at 11:47

## 2021-05-16 RX ADMIN — Medication SCH EACH: at 17:40

## 2021-05-16 RX ADMIN — SODIUM CHLORIDE SCH MG: 9 INJECTION, SOLUTION INTRAVENOUS at 08:19

## 2021-05-16 RX ADMIN — OFLOXACIN SCH ML: 3 SOLUTION/ DROPS OPHTHALMIC at 12:00

## 2021-05-16 RX ADMIN — METOPROLOL TARTRATE SCH MG: 50 TABLET, FILM COATED ORAL at 08:20

## 2021-05-16 RX ADMIN — OFLOXACIN SCH ML: 3 SOLUTION/ DROPS OPHTHALMIC at 16:00

## 2021-05-16 RX ADMIN — INSULIN HUMAN PRN UNITS: 100 INJECTION, SOLUTION PARENTERAL at 17:40

## 2021-05-16 RX ADMIN — SODIUM CHLORIDE SCH MG: 9 INJECTION, SOLUTION INTRAVENOUS at 01:23

## 2021-05-16 RX ADMIN — TAMSULOSIN HYDROCHLORIDE SCH MG: 0.4 CAPSULE ORAL at 08:20

## 2021-05-16 RX ADMIN — Medication SCH EACH: at 05:22

## 2021-05-16 RX ADMIN — OFLOXACIN SCH ML: 3 SOLUTION/ DROPS OPHTHALMIC at 09:10

## 2021-05-16 RX ADMIN — AMLODIPINE BESYLATE SCH MG: 10 TABLET ORAL at 08:19

## 2021-05-16 RX ADMIN — SODIUM CHLORIDE SCH MG: 9 INJECTION, SOLUTION INTRAVENOUS at 20:43

## 2021-05-16 RX ADMIN — SODIUM CHLORIDE PRN MLS/HR: 9 INJECTION, SOLUTION INTRAVENOUS at 01:05

## 2021-05-16 NOTE — NUR
RN NOTES



NOTIFIED MD OF POSITIVE FECAL OCCULT TEST WITH NNO AT THIS TIME. WILL CONTINUE TO MONITOR

## 2021-05-16 NOTE — NUR
RN CLOSING NOTE

PATIENT IN BED A/O X4. PATIENT IS BREATHING EVENLY AND UNLABORED ON ROOM AIR. NO DISTRESS 
NOTED. PATIENT IS ABLE TO MAKE NEEDS KNOWN. PATIENT IS CONTINENT USES URINAL AND CAN 
AMBULATE. SKIN IS INTACT. PATIENT HAS LAC # 20 PATENT AND INTACT RUNNING NS @ 60 ML/HR. MD 
CALLED STATING PATIENT WILL HAVE EGD PROCEDURE TOMORROW AND SHOULD BE NPO AFTER MIDNIGHT. 
PATIENT NOTIFIED AND PATIENT AGREED. ALL  MEDICATIONS WERE GIVEN AS ORDERED. PATIENTS NEEDS 
MET. SAFETY MEASURES ARE IN PLACE, BED LOW LOCKED AND CALL LIGHT WITHIN REACH. WILL ENDORSE 
TO ONCOMING SHIFT.

## 2021-05-16 NOTE — NUR
MS RN OPENING NOTES



RECEIVED PATIENT IN BED A/O X4. PATIENT IS BREATHING EVENLY AND UNLABORED ON ROOM AIR. NO 
DISTRESS NOTED. PATIENT IS ABLE TO MAKE NEEDS KNOWN. PATIENT IS CONTINENT USES URINAL AND 
CAN AMBULATE. SKIN IS INTACT. PATIENT HAS LAC # 20 PATENT AND INTACT RUNNING NS @ 60 ML/HR. 
SAFETY MEASURES ARE IN PLACE, BED LOW LOCKED AND CALL LIGHT WITHIN REACH. WILL CONTINUE TO 
MONITOR.

## 2021-05-16 NOTE — NUR
MS RN NOTES

FAIRLY RESTED AT NIGHT,NO EPISODE OF ABDOMINAL PAIN,STOOL FOR OB COLLECTED.IVF IN 
PROGRESS.CALL LIGHT IN REACH,NEEDS ATTENDED,ON CLEAR LIQUIDS..

## 2021-05-16 NOTE — NUR
RN OPENING NOTES 

Patient is awake, A&Ox4. No c/o pain or discomfort. No signs of distress. Verbalizes 
understanding of being NPO after midnight for EGD procedure.

## 2021-05-17 VITALS — DIASTOLIC BLOOD PRESSURE: 69 MMHG | SYSTOLIC BLOOD PRESSURE: 118 MMHG

## 2021-05-17 VITALS — DIASTOLIC BLOOD PRESSURE: 54 MMHG | SYSTOLIC BLOOD PRESSURE: 132 MMHG

## 2021-05-17 VITALS — SYSTOLIC BLOOD PRESSURE: 116 MMHG | DIASTOLIC BLOOD PRESSURE: 60 MMHG

## 2021-05-17 LAB
BASOPHILS # BLD AUTO: 0.1 /CMM (ref 0–0.2)
BASOPHILS NFR BLD AUTO: 1.1 % (ref 0–2)
BILIRUB UR QL STRIP: NEGATIVE
BUN SERPL-MCNC: 10 MG/DL (ref 7–18)
CALCIUM SERPL-MCNC: 7.7 MG/DL (ref 8.5–10.1)
CHLORIDE SERPL-SCNC: 111 MMOL/L (ref 98–107)
CO2 SERPL-SCNC: 17 MMOL/L (ref 21–32)
COLOR UR: YELLOW
CREAT SERPL-MCNC: 1.3 MG/DL (ref 0.6–1.3)
CREAT UR-MCNC: 82.3 MG/DL (ref 30–125)
EOSINOPHIL NFR BLD AUTO: 3 % (ref 0–6)
GLUCOSE SERPL-MCNC: 110 MG/DL (ref 74–106)
GLUCOSE UR STRIP-MCNC: NEGATIVE MG/DL
HCT VFR BLD AUTO: 26 % (ref 39–51)
HGB BLD-MCNC: 8.7 G/DL (ref 13.5–17.5)
LEUKOCYTE ESTERASE UR QL STRIP: NEGATIVE
LYMPHOCYTES NFR BLD AUTO: 1.7 /CMM (ref 0.8–4.8)
LYMPHOCYTES NFR BLD AUTO: 21.6 % (ref 20–44)
MAGNESIUM SERPL-MCNC: 1.8 MG/DL (ref 1.8–2.4)
MCHC RBC AUTO-ENTMCNC: 33 G/DL (ref 31–36)
MCV RBC AUTO: 95 FL (ref 80–96)
MONOCYTES NFR BLD AUTO: 0.7 /CMM (ref 0.1–1.3)
MONOCYTES NFR BLD AUTO: 8.5 % (ref 2–12)
NEUTROPHILS # BLD AUTO: 5.3 /CMM (ref 1.8–8.9)
NEUTROPHILS NFR BLD AUTO: 65.8 % (ref 43–81)
NITRITE UR QL STRIP: NEGATIVE
PH UR STRIP: 6.5 [PH] (ref 5–8)
PHOSPHATE SERPL-MCNC: 3 MG/DL (ref 2.5–4.9)
PLATELET # BLD AUTO: 247 /CMM (ref 150–450)
POTASSIUM SERPL-SCNC: 4.3 MMOL/L (ref 3.5–5.1)
PROT UR QL STRIP: (no result) MG/DL
PROT UR-MCNC: 41.8 MG/DL (ref 0–11.9)
RBC # BLD AUTO: 2.79 MIL/UL (ref 4.5–6)
RBC #/AREA URNS HPF: (no result) /HPF (ref 0–2)
SODIUM SERPL-SCNC: 141 MMOL/L (ref 136–145)
SODIUM UR-SCNC: 168 MMOL/L (ref 40–220)
UROBILINOGEN UR STRIP-MCNC: 0.2 EU/DL
WBC #/AREA URNS HPF: (no result) /HPF
WBC #/AREA URNS HPF: (no result) /HPF (ref 0–3)
WBC NRBC COR # BLD AUTO: 8 K/UL (ref 4.3–11)

## 2021-05-17 PROCEDURE — 0DB68ZX EXCISION OF STOMACH, VIA NATURAL OR ARTIFICIAL OPENING ENDOSCOPIC, DIAGNOSTIC: ICD-10-PCS | Performed by: INTERNAL MEDICINE

## 2021-05-17 RX ADMIN — PREDNISOLONE ACETATE SCH ML: 10 SUSPENSION/ DROPS OPHTHALMIC at 12:02

## 2021-05-17 RX ADMIN — OFLOXACIN SCH ML: 3 SOLUTION/ DROPS OPHTHALMIC at 12:02

## 2021-05-17 RX ADMIN — SODIUM CHLORIDE SCH MG: 9 INJECTION, SOLUTION INTRAVENOUS at 08:44

## 2021-05-17 RX ADMIN — INSULIN HUMAN PRN UNITS: 100 INJECTION, SOLUTION PARENTERAL at 11:18

## 2021-05-17 RX ADMIN — VITAMIN D, TAB 1000IU (100/BT) SCH UNIT: 25 TAB at 08:50

## 2021-05-17 RX ADMIN — BACLOFEN SCH MG: 10 TABLET ORAL at 08:48

## 2021-05-17 RX ADMIN — Medication SCH EACH: at 00:07

## 2021-05-17 RX ADMIN — Medication SCH EACH: at 06:02

## 2021-05-17 RX ADMIN — Medication SCH EACH: at 17:00

## 2021-05-17 RX ADMIN — SODIUM CHLORIDE SCH MG: 9 INJECTION, SOLUTION INTRAVENOUS at 20:55

## 2021-05-17 RX ADMIN — PREDNISOLONE ACETATE SCH ML: 10 SUSPENSION/ DROPS OPHTHALMIC at 16:49

## 2021-05-17 RX ADMIN — SODIUM CHLORIDE PRN MLS/HR: 9 INJECTION, SOLUTION INTRAVENOUS at 14:24

## 2021-05-17 RX ADMIN — OFLOXACIN SCH ML: 3 SOLUTION/ DROPS OPHTHALMIC at 16:49

## 2021-05-17 RX ADMIN — INSULIN HUMAN PRN UNITS: 100 INJECTION, SOLUTION PARENTERAL at 17:00

## 2021-05-17 RX ADMIN — AMLODIPINE BESYLATE SCH MG: 10 TABLET ORAL at 08:49

## 2021-05-17 RX ADMIN — TAMSULOSIN HYDROCHLORIDE SCH MG: 0.4 CAPSULE ORAL at 08:48

## 2021-05-17 RX ADMIN — PREDNISOLONE ACETATE SCH ML: 10 SUSPENSION/ DROPS OPHTHALMIC at 08:37

## 2021-05-17 RX ADMIN — Medication SCH MG: at 08:49

## 2021-05-17 RX ADMIN — Medication SCH EACH: at 11:17

## 2021-05-17 RX ADMIN — OFLOXACIN SCH ML: 3 SOLUTION/ DROPS OPHTHALMIC at 08:37

## 2021-05-17 RX ADMIN — METOPROLOL TARTRATE SCH MG: 50 TABLET, FILM COATED ORAL at 08:49

## 2021-05-17 NOTE — NUR
RN NOTES



SPOKE WITH DR MARAVILLA WHO IS COVERING FOR DR BOOKER. PER MD PATIENT WILL BE SEEN FOR EGD 
PROCEDURE TOMORROW @0500. WILL RESUME PREVIOUS DIET AND NPO AFTER MIDNIGHT. WILL CONTINUE TO 
MONITOR

## 2021-05-17 NOTE — NUR
RN CLOSING NOTE



PATIENT IN BED A/O X4. PATIENT IS BREATHING EVENLY AND UNLABORED ON ROOM AIR. NO DISTRESS 
NOTED. PATIENT IS ABLE TO MAKE NEEDS KNOWN. PATIENT IS CONTINENT USES URINAL AND CAN 
AMBULATE. SKIN IS INTACT. PATIENT HAS LAC # 20 PATENT AND INTACT RUNNING NS @ 60 ML/HR. MD 
CALLED STATING PATIENT WILL HAVE EGD PROCEDURE TOMORROW @ 0500 AM. SURGERY TEAM CALLED AND 
CONFIRMED. PATIENT SHOULD BE NPO AFTER MIDNIGHT. PATIENT NOTIFIED AND PATIENT AGREED. ALL  
MEDICATIONS WERE GIVEN AS ORDERED. PATIENTS NEEDS MET. SAFETY MEASURES ARE IN PLACE, BED LOW 
LOCKED AND CALL LIGHT WITHIN REACH. WILL ENDORSE TO ONCOMING SHIFT.

## 2021-05-17 NOTE — NUR
RN NOTES



ALL MORNING ORAL MEDICATIONS HELD AND PATIENT IS NPO FOR EGD PROCEDURE TODAY. PATIENTS VITAL 
SIGNS STABLE /54 HR 54 RR 18 O2 SAT 98% ON ROOM AIR. WILL CONTINUE TO MONITOR

## 2021-05-17 NOTE — NUR
MSRN

FULLY AWAKE, A/O X4, ON RA/  ABDOMEN DISTENDED, DENIES ANY DISCOMFORTS AS OF THIS TIME.  
VERY PLEASANT, AWARE OF PROCEDURE TOMORROW EGD .  INSTRUCTED NPO POST MIDNIGHT APPEARS TO 
UNDERSTAND.  TRANSLATED IN Danish.REMINDED T CALL STAFF FOR ANY ASSISTANCE OR DISCOMFORTS.  
CALL LIGHT WITHIN REACH.  NO NEEDS AS OF THIS TIME.

## 2021-05-17 NOTE — NUR
MS RN OPENING NOTES



RECEIVED PATIENT IN BED A/O X4. PATIENT IS BREATHING EVENLY AND UNLABORED ON ROOM AIR. NO 
DISTRESS NOTED. PATIENT IS ABLE TO MAKE NEEDS KNOWN. PATIENT HAS LAC # 20 PATENT AND INTACT 
RUNNING NS @ 60 ML/HR. PATIENT HAS BEEN NPO SINCE MIDNIGHT. CONSENTS AND CHECKLIST COMPLETE. 
SAFETY MEASURES ARE IN PLACE, BED LOW LOCKED AND CALL LIGHT WITHIN REACH. WILL CONTINUE TO 
MONITOR.

## 2021-05-17 NOTE — NUR
MS RN CLOSING NOTES

Patient is A&Ox4. VSS. No c/o pain or discomfort. No distress noted. IV patent and infusing 
NS at 60cc/hr. Patient removed dentures and provided with oral care in getting ready for EGD 
procedure. Checklist and consents completed. 0600 accucheck is 112, no coverage. Patient 
compliant with NPO status since midnight. No s/s of obvious bleeding overnight. Will 
endorse.

## 2021-05-18 VITALS — DIASTOLIC BLOOD PRESSURE: 58 MMHG | SYSTOLIC BLOOD PRESSURE: 123 MMHG

## 2021-05-18 VITALS — DIASTOLIC BLOOD PRESSURE: 61 MMHG | SYSTOLIC BLOOD PRESSURE: 119 MMHG

## 2021-05-18 LAB
ALBUMIN SERPL BCP-MCNC: 2.5 G/DL (ref 3.4–5)
ALP SERPL-CCNC: 162 U/L (ref 46–116)
ALT SERPL W P-5'-P-CCNC: 32 U/L (ref 12–78)
AST SERPL W P-5'-P-CCNC: 36 U/L (ref 15–37)
BASOPHILS # BLD AUTO: 0.1 /CMM (ref 0–0.2)
BASOPHILS NFR BLD AUTO: 1.4 % (ref 0–2)
BILIRUB SERPL-MCNC: 0.3 MG/DL (ref 0.2–1)
BUN SERPL-MCNC: 14 MG/DL (ref 7–18)
CALCIUM SERPL-MCNC: 8.1 MG/DL (ref 8.5–10.1)
CHLORIDE SERPL-SCNC: 110 MMOL/L (ref 98–107)
CO2 SERPL-SCNC: 18 MMOL/L (ref 21–32)
CREAT SERPL-MCNC: 1.4 MG/DL (ref 0.6–1.3)
EOSINOPHIL NFR BLD AUTO: 2.4 % (ref 0–6)
GLUCOSE SERPL-MCNC: 186 MG/DL (ref 74–106)
HCT VFR BLD AUTO: 27 % (ref 39–51)
HGB BLD-MCNC: 8.9 G/DL (ref 13.5–17.5)
LYMPHOCYTES NFR BLD AUTO: 1.5 /CMM (ref 0.8–4.8)
LYMPHOCYTES NFR BLD AUTO: 23.1 % (ref 20–44)
MAGNESIUM SERPL-MCNC: 1.6 MG/DL (ref 1.8–2.4)
MCHC RBC AUTO-ENTMCNC: 33 G/DL (ref 31–36)
MCV RBC AUTO: 96 FL (ref 80–96)
MONOCYTES NFR BLD AUTO: 0.5 /CMM (ref 0.1–1.3)
MONOCYTES NFR BLD AUTO: 8.7 % (ref 2–12)
NEUTROPHILS # BLD AUTO: 4.1 /CMM (ref 1.8–8.9)
NEUTROPHILS NFR BLD AUTO: 64.4 % (ref 43–81)
PHOSPHATE SERPL-MCNC: 2.7 MG/DL (ref 2.5–4.9)
PLATELET # BLD AUTO: 221 /CMM (ref 150–450)
POTASSIUM SERPL-SCNC: 4.1 MMOL/L (ref 3.5–5.1)
PROT SERPL-MCNC: 5.9 G/DL (ref 6.4–8.2)
RBC # BLD AUTO: 2.82 MIL/UL (ref 4.5–6)
SODIUM SERPL-SCNC: 140 MMOL/L (ref 136–145)
WBC NRBC COR # BLD AUTO: 6.3 K/UL (ref 4.3–11)

## 2021-05-18 RX ADMIN — Medication SCH EACH: at 12:27

## 2021-05-18 RX ADMIN — Medication SCH MG: at 09:00

## 2021-05-18 RX ADMIN — Medication SCH EACH: at 05:13

## 2021-05-18 RX ADMIN — TAMSULOSIN HYDROCHLORIDE SCH MG: 0.4 CAPSULE ORAL at 09:43

## 2021-05-18 RX ADMIN — PREDNISOLONE ACETATE SCH ML: 10 SUSPENSION/ DROPS OPHTHALMIC at 09:43

## 2021-05-18 RX ADMIN — VITAMIN D, TAB 1000IU (100/BT) SCH UNIT: 25 TAB at 09:43

## 2021-05-18 RX ADMIN — OFLOXACIN SCH ML: 3 SOLUTION/ DROPS OPHTHALMIC at 09:43

## 2021-05-18 RX ADMIN — SODIUM CHLORIDE SCH MG: 9 INJECTION, SOLUTION INTRAVENOUS at 09:43

## 2021-05-18 RX ADMIN — PREDNISOLONE ACETATE SCH ML: 10 SUSPENSION/ DROPS OPHTHALMIC at 13:03

## 2021-05-18 RX ADMIN — OFLOXACIN SCH ML: 3 SOLUTION/ DROPS OPHTHALMIC at 13:03

## 2021-05-18 RX ADMIN — INSULIN HUMAN PRN UNITS: 100 INJECTION, SOLUTION PARENTERAL at 12:29

## 2021-05-18 RX ADMIN — Medication SCH EACH: at 00:20

## 2021-05-18 RX ADMIN — AMLODIPINE BESYLATE SCH MG: 10 TABLET ORAL at 09:00

## 2021-05-18 RX ADMIN — BACLOFEN SCH MG: 10 TABLET ORAL at 09:43

## 2021-05-18 RX ADMIN — METOPROLOL TARTRATE SCH MG: 50 TABLET, FILM COATED ORAL at 09:00

## 2021-05-18 NOTE — NUR
dtr. here given all dc instructions with good understanding. hep lock out. given mag oxide 
for low mg.all papers signed and transported to lobby via w/c accompanied by dtr. and cna.

## 2021-11-14 ENCOUNTER — HOSPITAL ENCOUNTER (EMERGENCY)
Dept: HOSPITAL 54 - ER | Age: 71
Discharge: HOME | End: 2021-11-14
Payer: MEDICARE

## 2021-11-14 VITALS — HEIGHT: 64 IN | BODY MASS INDEX: 31.07 KG/M2 | WEIGHT: 182 LBS

## 2021-11-14 VITALS — SYSTOLIC BLOOD PRESSURE: 148 MMHG | DIASTOLIC BLOOD PRESSURE: 68 MMHG

## 2021-11-14 DIAGNOSIS — Z88.6: ICD-10-CM
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
## 2021-11-14 DIAGNOSIS — S01.112A: Primary | ICD-10-CM
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
## 2021-11-14 DIAGNOSIS — I10: ICD-10-CM

## 2021-11-14 DIAGNOSIS — E78.5: ICD-10-CM
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
## 2021-11-14 DIAGNOSIS — F17.200: ICD-10-CM

## 2021-11-14 DIAGNOSIS — Z79.899: ICD-10-CM

## 2021-11-14 DIAGNOSIS — Z98.890: ICD-10-CM

## 2021-11-14 DIAGNOSIS — E11.9: ICD-10-CM

## 2021-11-14 PROCEDURE — 70450 CT HEAD/BRAIN W/O DYE: CPT

## 2021-11-14 PROCEDURE — 99284 EMERGENCY DEPT VISIT MOD MDM: CPT

## 2021-11-14 PROCEDURE — 90471 IMMUNIZATION ADMIN: CPT

## 2021-11-14 PROCEDURE — 12013 RPR F/E/E/N/L/M 2.6-5.0 CM: CPT

## 2021-11-14 PROCEDURE — 70486 CT MAXILLOFACIAL W/O DYE: CPT

## 2021-11-14 PROCEDURE — 90715 TDAP VACCINE 7 YRS/> IM: CPT

## 2021-11-14 RX ADMIN — LIDOCAINE HYDROCHLORIDE AND EPINEPHRINE ONE ML: 10; 10 INJECTION, SOLUTION INFILTRATION; PERINEURAL at 14:34

## 2021-11-14 RX ADMIN — CLOSTRIDIUM TETANI TOXOID ANTIGEN (FORMALDEHYDE INACTIVATED), CORYNEBACTERIUM DIPHTHERIAE TOXOID ANTIGEN (FORMALDEHYDE INACTIVATED), BORDETELLA PERTUSSIS TOXOID ANTIGEN (GLUTARALDEHYDE INACTIVATED), BORDETELLA PERTUSSIS FILAMENTOUS HEMAGGLUTININ ANTIGEN (FORMALDEHYDE INACTIVATED), BORDETELLA PERTUSSIS PERTACTIN ANTIGEN, AND BORDETELLA PERTUSSIS FIMBRIAE 2/3 ANTIGEN ONE ML: 5; 2; 2.5; 5; 3; 5 INJECTION, SUSPENSION INTRAMUSCULAR at 14:38

## 2021-11-14 NOTE — NUR
PT IS WHEELED TO CT SCAN VIA [facility].
Patient discharged to home in stable condition. Written and verbal after care 
instructions given. Patient verbalizes understanding of instruction.
SUTURING DONE BY 
WOUND DRESSING DONE BY  TECH.
- - -

## 2024-01-02 ENCOUNTER — HOSPITAL ENCOUNTER (EMERGENCY)
Dept: HOSPITAL 54 - ER | Age: 74
Discharge: HOME | End: 2024-01-02
Payer: MEDICARE

## 2024-01-02 VITALS — TEMPERATURE: 98 F | SYSTOLIC BLOOD PRESSURE: 143 MMHG | OXYGEN SATURATION: 99 % | DIASTOLIC BLOOD PRESSURE: 49 MMHG

## 2024-01-02 VITALS — HEIGHT: 64 IN | WEIGHT: 192 LBS | BODY MASS INDEX: 32.78 KG/M2

## 2024-01-02 DIAGNOSIS — I10: Primary | ICD-10-CM

## 2024-01-02 DIAGNOSIS — Z88.8: ICD-10-CM

## 2024-01-02 DIAGNOSIS — F17.200: ICD-10-CM

## 2024-01-02 DIAGNOSIS — Z79.899: ICD-10-CM

## 2024-07-30 NOTE — RADRPT
PROCEDURE:   CT abdomen and pelvis without contrast. 

 

CLINICAL INDICATION:   Abdominal Pain   

 

TECHNIQUE:   CT scan of the abdomen and pelvis without contrast was performed and is reconstructed a
t 2.5 mm contiguous axial intervals from the dome of the diaphragm to the inferior pubic rami..  The
 patient was scanned without intravenous contrast.  Sagittal and coronal reformatted images were obt
ained from the axial source images. The calculated radiation dose measures 1191 mGy centimeters. The
 CTDI measures 21 mGy.

 

 

COMPARISON:   None. 

 

FINDINGS:

 

 

The lung bases are clear of any infiltrate or nodule.  No effusion is seen.

The liver has a nodular contour compatible with cirrhosis.  There is diminished attenuation.  No mas
s or ductal dilatation is seen.  Patency of the portal vein is indeterminate on this noncontrast april
dy.    No gallstones are visualized.  No splenic, adrenal or pancreatic abnormalities present. 

 Kidneys are of normal size and contour.  No hydronephrosis, calculus or masses seen.  Ureters are o
f normal course and caliber with no stone.  No bladder mass or stone is present. Prostate and semina
l vesicles appear normal.  

  There is no aneurysm.  No adenopathy is present. There are vascular calcifications.

  No bowel mass or obstruction is present.  The appendix is normal.  No phlegmon, ascites or pneumop
eritoneum is visualized.

The osseous structures are intact.

 

IMPRESSION:

No evidence of urolithiasis, obstructive uropathy, diverticulitis or appendicitis.

 

Cirrhosis.

 

Vascular calcifications.

_____________________________________________ 

.Ned Roman MD, MD           Date    Time 

Electronically viewed and signed by .Ned Roman MD, MD on 03/21/2017 20:27 

 

D:  03/21/2017 20:27  T:  03/21/2017 20:27

.A/
160.02

## 2025-07-19 ENCOUNTER — HOSPITAL ENCOUNTER (EMERGENCY)
Dept: HOSPITAL 54 - ER | Age: 75
Discharge: HOME | End: 2025-07-19
Payer: MEDICARE

## 2025-07-19 VITALS — HEIGHT: 64 IN | BODY MASS INDEX: 34.31 KG/M2 | WEIGHT: 201 LBS

## 2025-07-19 VITALS — SYSTOLIC BLOOD PRESSURE: 136 MMHG | OXYGEN SATURATION: 96 % | DIASTOLIC BLOOD PRESSURE: 80 MMHG

## 2025-07-19 VITALS — TEMPERATURE: 97.7 F

## 2025-07-19 DIAGNOSIS — I10: ICD-10-CM
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
## 2025-07-19 DIAGNOSIS — Z79.1: ICD-10-CM

## 2025-07-19 DIAGNOSIS — Z79.899: ICD-10-CM
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
## 2025-07-19 DIAGNOSIS — F17.200: ICD-10-CM
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
## 2025-07-19 DIAGNOSIS — S01.21XA: Primary | ICD-10-CM
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
## 2025-07-19 DIAGNOSIS — Z88.5: ICD-10-CM

## 2025-07-19 DIAGNOSIS — Z79.84: ICD-10-CM

## 2025-07-19 DIAGNOSIS — E03.9: ICD-10-CM

## 2025-07-19 RX ADMIN — CLOSTRIDIUM TETANI TOXOID ANTIGEN (FORMALDEHYDE INACTIVATED), CORYNEBACTERIUM DIPHTHERIAE TOXOID ANTIGEN (FORMALDEHYDE INACTIVATED), BORDETELLA PERTUSSIS TOXOID ANTIGEN (GLUTARALDEHYDE INACTIVATED), BORDETELLA PERTUSSIS FILAMENTOUS HEMAGGLUTININ ANTIGEN (FORMALDEHYDE INACTIVATED), BORDETELLA PERTUSSIS PERTACTIN ANTIGEN, AND BORDETELLA PERTUSSIS FIMBRIAE 2/3 ANTIGEN ONE ML: 5; 2; 2.5; 5; 3; 5 INJECTION, SUSPENSION INTRAMUSCULAR at 20:30

## 2025-07-19 RX ADMIN — LIDOCAINE HYDROCHLORIDE ONE MG: 10 INJECTION, SOLUTION INFILTRATION; PERINEURAL at 22:11
